# Patient Record
Sex: MALE | Race: WHITE | Employment: FULL TIME | URBAN - METROPOLITAN AREA
[De-identification: names, ages, dates, MRNs, and addresses within clinical notes are randomized per-mention and may not be internally consistent; named-entity substitution may affect disease eponyms.]

---

## 2019-12-17 ENCOUNTER — APPOINTMENT (OUTPATIENT)
Dept: GENERAL RADIOLOGY | Age: 69
End: 2019-12-17
Attending: EMERGENCY MEDICINE
Payer: MEDICARE

## 2019-12-17 ENCOUNTER — APPOINTMENT (OUTPATIENT)
Dept: CT IMAGING | Age: 69
End: 2019-12-17
Attending: EMERGENCY MEDICINE
Payer: MEDICARE

## 2019-12-17 ENCOUNTER — HOSPITAL ENCOUNTER (EMERGENCY)
Age: 69
Discharge: HOME | End: 2019-12-17
Attending: EMERGENCY MEDICINE
Payer: MEDICARE

## 2019-12-17 VITALS
TEMPERATURE: 97.7 F | SYSTOLIC BLOOD PRESSURE: 143 MMHG | BODY MASS INDEX: 26.6 KG/M2 | DIASTOLIC BLOOD PRESSURE: 88 MMHG | RESPIRATION RATE: 23 BRPM | HEART RATE: 60 BPM | OXYGEN SATURATION: 100 % | WEIGHT: 190 LBS | HEIGHT: 71 IN

## 2019-12-17 DIAGNOSIS — R77.8 ELEVATED TROPONIN: ICD-10-CM

## 2019-12-17 DIAGNOSIS — R01.1 MURMUR, HEART: ICD-10-CM

## 2019-12-17 DIAGNOSIS — R07.9 ACUTE CHEST PAIN: Primary | ICD-10-CM

## 2019-12-17 LAB
ALBUMIN SERPL-MCNC: 3.6 G/DL (ref 3.5–5)
ALBUMIN/GLOB SERPL: 1.2 {RATIO} (ref 1.1–2.2)
ALP SERPL-CCNC: 68 U/L (ref 45–117)
ALT SERPL-CCNC: 24 U/L (ref 12–78)
ANION GAP SERPL CALC-SCNC: 4 MMOL/L (ref 5–15)
AST SERPL-CCNC: 14 U/L (ref 15–37)
ATRIAL RATE: 61 BPM
ATRIAL RATE: 65 BPM
BASOPHILS # BLD: 0 K/UL (ref 0–0.1)
BASOPHILS NFR BLD: 1 % (ref 0–1)
BILIRUB SERPL-MCNC: 0.7 MG/DL (ref 0.2–1)
BUN SERPL-MCNC: 20 MG/DL (ref 6–20)
BUN/CREAT SERPL: 16 (ref 12–20)
CALCIUM SERPL-MCNC: 8.6 MG/DL (ref 8.5–10.1)
CALCULATED P AXIS, ECG09: 31 DEGREES
CALCULATED P AXIS, ECG09: 40 DEGREES
CALCULATED R AXIS, ECG10: 16 DEGREES
CALCULATED R AXIS, ECG10: 17 DEGREES
CALCULATED T AXIS, ECG11: 31 DEGREES
CALCULATED T AXIS, ECG11: 37 DEGREES
CHLORIDE SERPL-SCNC: 108 MMOL/L (ref 97–108)
CK SERPL-CCNC: 90 U/L (ref 39–308)
CO2 SERPL-SCNC: 28 MMOL/L (ref 21–32)
CREAT SERPL-MCNC: 1.22 MG/DL (ref 0.7–1.3)
DIAGNOSIS, 93000: NORMAL
DIAGNOSIS, 93000: NORMAL
DIFFERENTIAL METHOD BLD: ABNORMAL
EOSINOPHIL # BLD: 0.2 K/UL (ref 0–0.4)
EOSINOPHIL NFR BLD: 3 % (ref 0–7)
ERYTHROCYTE [DISTWIDTH] IN BLOOD BY AUTOMATED COUNT: 13.2 % (ref 11.5–14.5)
GLOBULIN SER CALC-MCNC: 3.1 G/DL (ref 2–4)
GLUCOSE SERPL-MCNC: 106 MG/DL (ref 65–100)
HCT VFR BLD AUTO: 41.7 % (ref 36.6–50.3)
HGB BLD-MCNC: 14 G/DL (ref 12.1–17)
IMM GRANULOCYTES # BLD AUTO: 0.1 K/UL (ref 0–0.04)
IMM GRANULOCYTES NFR BLD AUTO: 1 % (ref 0–0.5)
LYMPHOCYTES # BLD: 1.4 K/UL (ref 0.8–3.5)
LYMPHOCYTES NFR BLD: 26 % (ref 12–49)
MCH RBC QN AUTO: 31.6 PG (ref 26–34)
MCHC RBC AUTO-ENTMCNC: 33.6 G/DL (ref 30–36.5)
MCV RBC AUTO: 94.1 FL (ref 80–99)
MONOCYTES # BLD: 0.5 K/UL (ref 0–1)
MONOCYTES NFR BLD: 9 % (ref 5–13)
NEUTS SEG # BLD: 3.3 K/UL (ref 1.8–8)
NEUTS SEG NFR BLD: 61 % (ref 32–75)
NRBC # BLD: 0 K/UL (ref 0–0.01)
NRBC BLD-RTO: 0 PER 100 WBC
P-R INTERVAL, ECG05: 148 MS
P-R INTERVAL, ECG05: 150 MS
PLATELET # BLD AUTO: 149 K/UL (ref 150–400)
PMV BLD AUTO: 9.1 FL (ref 8.9–12.9)
POTASSIUM SERPL-SCNC: 4.2 MMOL/L (ref 3.5–5.1)
PROT SERPL-MCNC: 6.7 G/DL (ref 6.4–8.2)
Q-T INTERVAL, ECG07: 418 MS
Q-T INTERVAL, ECG07: 422 MS
QRS DURATION, ECG06: 102 MS
QRS DURATION, ECG06: 86 MS
QTC CALCULATION (BEZET), ECG08: 420 MS
QTC CALCULATION (BEZET), ECG08: 438 MS
RBC # BLD AUTO: 4.43 M/UL (ref 4.1–5.7)
SODIUM SERPL-SCNC: 140 MMOL/L (ref 136–145)
TROPONIN I SERPL-MCNC: 0.07 NG/ML
TROPONIN I SERPL-MCNC: 0.08 NG/ML
VENTRICULAR RATE, ECG03: 61 BPM
VENTRICULAR RATE, ECG03: 65 BPM
WBC # BLD AUTO: 5.4 K/UL (ref 4.1–11.1)

## 2019-12-17 PROCEDURE — 84484 ASSAY OF TROPONIN QUANT: CPT

## 2019-12-17 PROCEDURE — 99285 EMERGENCY DEPT VISIT HI MDM: CPT

## 2019-12-17 PROCEDURE — 85025 COMPLETE CBC W/AUTO DIFF WBC: CPT

## 2019-12-17 PROCEDURE — 82550 ASSAY OF CK (CPK): CPT

## 2019-12-17 PROCEDURE — 71046 X-RAY EXAM CHEST 2 VIEWS: CPT

## 2019-12-17 PROCEDURE — 36415 COLL VENOUS BLD VENIPUNCTURE: CPT

## 2019-12-17 PROCEDURE — 80053 COMPREHEN METABOLIC PANEL: CPT

## 2019-12-17 PROCEDURE — 70450 CT HEAD/BRAIN W/O DYE: CPT

## 2019-12-17 PROCEDURE — 93005 ELECTROCARDIOGRAM TRACING: CPT

## 2019-12-17 NOTE — ED PROVIDER NOTES
EMERGENCY DEPARTMENT HISTORY AND PHYSICAL EXAM      Date: 12/17/2019  Patient Name: Kenny Syed    History of Presenting Illness     Chief Complaint   Patient presents with    Chest Pain       History Provided By: Patient    HPI: Kenny Syed, 68 y.o. male presents to the ED with complaint of chest discomfort beginning Sunday while in [employer]. Patient reports he was sitting there and became dizzy. Jannette to stand and felt like he was listing to the right but this quickly resolved by the time he exited the [employer]. Since that time he is described left chest pain and \"my heart area\" and rates it a 6-7 out of 10 with episodes lasting 1 minute at a time. 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 He has 2 distant stress tests in the past and knows of a heart murmur but it is been sometime since he recently transition from [place] to the [facility]. He recently lost his longtime partner 2 months ago and says he is thoughtful that may be some of this pain he is having at night is because he is grieving. He does not have a history of an MI but does have hypertension no hyperlipidemia or diabetes history and a very distant history of tobacco use there is no known family history of coronary disease. There are no other complaints, changes, or physical findings at this time. PCP: Other, MD Tanner    No current facility-administered medications on file prior to encounter. Current Outpatient Medications on File Prior to Encounter   Medication Sig Dispense Refill    oxyCODONE-acetaminophen (PERCOCET) 5-325 mg per tablet Take 2 Tabs by mouth every four (4) hours as needed for Pain. 80 Tab 0    oxyCODONE IR (ROXICODONE) 5 mg immediate release tablet Take 1 Tab by mouth every six (6) hours as needed for Pain. 20 Tab 0    promethazine (PHENERGAN) 12.5 mg tablet Take 1 Tab by mouth every six (6) hours as needed for Nausea.  20 Tab 0    amLODIPine-benazepril (LOTREL) 5-20 mg per capsule Take 1 Cap by mouth daily. Past History     Past Medical History:  Past Medical History:   Diagnosis Date    Hypertension        Past Surgical History:  Past Surgical History:   Procedure Laterality Date    HX ORTHOPAEDIC      neck surgery       Family History:  History reviewed. No pertinent family history. Social History:  Social History     Tobacco Use    Smoking status: Never Smoker    Smokeless tobacco: Never Used   Substance Use Topics    Alcohol use: Yes    Drug use: No       Allergies:  No Known Allergies      Review of Systems   Review of Systems   Constitutional: Negative for activity change, appetite change, fever and unexpected weight change. HENT: Negative for congestion, sore throat and voice change. Eyes: Negative for visual disturbance. Respiratory: Negative for chest tightness and shortness of breath. Cardiovascular: Positive for chest pain. Negative for palpitations and leg swelling. No family history of PE or personal history of PE. Gastrointestinal: Negative for abdominal distention and abdominal pain. Genitourinary: Negative for difficulty urinating and hematuria. Neurological: Negative for dizziness, tremors, seizures, syncope, speech difficulty, weakness, light-headedness, numbness and headaches. Patient says he is no longer having any neurological symptoms like he was experiencing in [employer] on Sunday. He denies any difficulty with speech any weakness in his arms or legs or decreased sensation walking. Hematological: Negative for adenopathy. Does not bruise/bleed easily. All other systems reviewed and are negative. Physical Exam   Physical Exam   Vital signs and nursing notes reviewed    CONSTITUTIONAL: Alert, in mild distress; well-developed; well-nourished. HEAD:  Normocephalic, atraumatic  EYES: PERRL; EOM's intact. ENTM: Nose: no rhinorrhea;  Throat: no erythema or exudate, mucous membranes moist  Neck: Supple. trachea is midline. No JVD. Carotid bruits bilaterally. RESP: Chest clear, equal breath sounds. - W/R/R  CV: S1 and S2 WNL; 3 out of 6 systolic murmur best heard at the left sternal border. 2+ radial and DP pulses bilaterally. GI: non-distended, normal bowel sounds, abdomen soft and non-tender. No masses or organomegaly. No midline pulsatile mass on direct palpation. : No costo-vertebral angle tenderness. BACK:  Non-tender, normal appearance  UPPER EXT:  Normal inspection. no joint or soft tissue swelling  LOWER EXT: No edema, no calf tenderness. NEURO: Alert and oriented x3, 5/5 strength and light touch sensation intact in bilateral upper and lower extremities. No dysarthria, no neglect, horizontal gazes intact, good finger-to-nose, good heel-to-shin. SKIN: No rashes;  Warm and dry  PSYCH: Normal mood, normal affect    Diagnostic Study Results     Labs -     Recent Results (from the past 12 hour(s))   EKG, 12 LEAD, INITIAL    Collection Time: 12/17/19  9:20 AM   Result Value Ref Range    Ventricular Rate 65 BPM    Atrial Rate 65 BPM    P-R Interval 148 ms    QRS Duration 102 ms    Q-T Interval 422 ms    QTC Calculation (Bezet) 438 ms    Calculated P Axis 40 degrees    Calculated R Axis 17 degrees    Calculated T Axis 37 degrees    Diagnosis       Normal sinus rhythm  Possible Left atrial enlargement  Incomplete right bundle branch block    No previous ECGs available  Confirmed by Kimberly Tesfaye ([id number]) on 12/17/2019 10:25:24 AM     CBC WITH AUTOMATED DIFF    Collection Time: 12/17/19  9:35 AM   Result Value Ref Range    WBC 5.4 4.1 - 11.1 K/uL    RBC 4.43 4.10 - 5.70 M/uL    HGB 14.0 12.1 - 17.0 g/dL    HCT 41.7 36.6 - 50.3 %    MCV 94.1 80.0 - 99.0 FL    MCH 31.6 26.0 - 34.0 PG    MCHC 33.6 30.0 - 36.5 g/dL    RDW 13.2 11.5 - 14.5 %    PLATELET 608 (L) 727 - 400 K/uL    MPV 9.1 8.9 - 12.9 FL    NRBC 0.0 0  WBC    ABSOLUTE NRBC 0.00 0.00 - 0.01 K/uL    NEUTROPHILS 61 32 - 75 %    LYMPHOCYTES 26 12 - 49 %    MONOCYTES 9 5 - 13 %    EOSINOPHILS 3 0 - 7 %    BASOPHILS 1 0 - 1 %    IMMATURE GRANULOCYTES 1 (H) 0.0 - 0.5 %    ABS. NEUTROPHILS 3.3 1.8 - 8.0 K/UL    ABS. LYMPHOCYTES 1.4 0.8 - 3.5 K/UL    ABS. MONOCYTES 0.5 0.0 - 1.0 K/UL    ABS. EOSINOPHILS 0.2 0.0 - 0.4 K/UL    ABS. BASOPHILS 0.0 0.0 - 0.1 K/UL    ABS. IMM. GRANS. 0.1 (H) 0.00 - 0.04 K/UL    DF AUTOMATED     METABOLIC PANEL, COMPREHENSIVE    Collection Time: 12/17/19  9:35 AM   Result Value Ref Range    Sodium 140 136 - 145 mmol/L    Potassium 4.2 3.5 - 5.1 mmol/L    Chloride 108 97 - 108 mmol/L    CO2 28 21 - 32 mmol/L    Anion gap 4 (L) 5 - 15 mmol/L    Glucose 106 (H) 65 - 100 mg/dL    BUN 20 6 - 20 MG/DL    Creatinine 1.22 0.70 - 1.30 MG/DL    BUN/Creatinine ratio 16 12 - 20      GFR est AA >60 >60 ml/min/1.73m2    GFR est non-AA 59 (L) >60 ml/min/1.73m2    Calcium 8.6 8.5 - 10.1 MG/DL    Bilirubin, total 0.7 0.2 - 1.0 MG/DL    ALT (SGPT) 24 12 - 78 U/L    AST (SGOT) 14 (L) 15 - 37 U/L    Alk.  phosphatase 68 45 - 117 U/L    Protein, total 6.7 6.4 - 8.2 g/dL    Albumin 3.6 3.5 - 5.0 g/dL    Globulin 3.1 2.0 - 4.0 g/dL    A-G Ratio 1.2 1.1 - 2.2     CK W/ REFLX CKMB    Collection Time: 12/17/19  9:35 AM   Result Value Ref Range    CK 90 39 - 308 U/L   TROPONIN I    Collection Time: 12/17/19  9:35 AM   Result Value Ref Range    Troponin-I, Qt. 0.08 (H) <0.05 ng/mL   EKG, 12 LEAD, SUBSEQUENT    Collection Time: 12/17/19 12:34 PM   Result Value Ref Range    Ventricular Rate 61 BPM    Atrial Rate 61 BPM    P-R Interval 150 ms    QRS Duration 86 ms    Q-T Interval 418 ms    QTC Calculation (Bezet) 420 ms    Calculated P Axis 31 degrees    Calculated R Axis 16 degrees    Calculated T Axis 31 degrees    Diagnosis       Sinus rhythm with occasional premature ventricular complexes  When compared with ECG of 17-DEC-2019 09:20,  premature ventricular complexes are now present  Non-specific change in ST segment in Anterior leads  T wave inversion no longer evident in Anterior leads     TROPONIN I    Collection Time: 12/17/19 12:39 PM   Result Value Ref Range    Troponin-I, Qt. 0.07 (H) <0.05 ng/mL       Radiologic Studies -   CT HEAD WO CONT   Final Result   IMPRESSION:   1. No evidence of acute intracranial abnormality. 2. Moderate chronic microvascular ischemic disease. 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 XR CHEST PA LAT   Final Result   Impression: No acute process or change compared to the prior exam.           CT Results  (Last 48 hours)               12/17/19 1059  CT HEAD WO CONT Final result    Impression:  IMPRESSION:   1. No evidence of acute intracranial abnormality. 2. Moderate chronic microvascular ischemic disease. 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 Narrative:  EXAM:  CT HEAD WO CONT       INDICATION:   Chest pain and TIA symptoms. COMPARISON: CT head 12/21/2012. TECHNIQUE: Unenhanced CT of the head was performed using 5 mm images. Brain and   bone windows were generated. CT dose reduction was achieved through use of a   standardized protocol tailored for this examination and automatic exposure   control for dose modulation. FINDINGS:   The ventricles are normal in size and position. Scattered periventricular and   deep white matter hypodensities, confluent in regions, consistent with moderate   chronic microangiopathic ischemic changes. Chronic infarct in the left corona   radiata/basal ganglia. Basilar cisterns are patent. No midline shift. There is   no evidence of acute infarct, hemorrhage, or extraaxial fluid collection. The paranasal sinuses, mastoid air cells, and middle ears are clear. The orbital   contents are within normal limits with bilateral lens implants. There are no   significant osseous or extracranial soft tissue lesions.                CXR Results  (Last 48 hours)               12/17/19 0944  XR CHEST PA LAT Final result Impression:  Impression: No acute process or change compared to the prior exam.           Narrative:  Exam:  2 view chest       Indication: Chest pain for one week       Comparison to 12/21/2012. PA and lateral views demonstrate normal heart size. There is no acute process in   the lung fields. Degenerative changes are seen in the thoracic spine. Medical Decision Making   I am the first provider for this patient. I reviewed the vital signs, available nursing notes, past medical history, past surgical history, family history and social history. Vital Signs-Reviewed the patient's vital signs. Patient Vitals for the past 12 hrs:   Temp Pulse Resp BP SpO2   12/17/19 1300  60 23 143/88 100 %   12/17/19 1230  71 16 165/76 99 %   12/17/19 1200  61 18 152/84 99 %   12/17/19 1130  (!) 59 21 145/76 98 %   12/17/19 1112  62  151/80    12/17/19 1036  64 23 153/90 93 %   12/17/19 0925 97.7 °F (36.5 °C) 63 18 (!) 156/91 98 %       EKG interpretation: (Preliminary)  EKG performed at 9:20 AM, as interpreted by me shows normal sinus rhythm at axis, normal intervals, no visible acute ischemic changes. Subsequent EKG performed at 12:34 PM, as interpreted by me, patient was 0-10 chest pain shows a sinus rhythm at a rate of 61 normal PVC, no visible acute ischemic changes. Records Reviewed: Nursing Notes and Old Medical Records    Provider Notes (Medical Decision Making):   25-year-old male with acute chest pain and heart murmur with slightly elevated troponin without current chest discomfort. Discussed patient with Dr. Lauren Ordaz, who would like to see patient follow-up for his murmur and for further cardiac testing but reassured by second EKG and lower troponin. It is possible that patient potentially had a very short TIA but cannot confirm, old infarct is seen on CT but likely would be subacute if from Sunday at [employer].   Patient is asymptomatic on leaving, discussed follow-up and provided  Rafal's contact information for follow-up. ED Course:   Initial assessment performed. The patients presenting problems have been discussed, and they are in agreement with the care plan formulated and outlined with them. I have encouraged them to ask questions as they arise throughout their visit. Disposition:  Discharge    Discharge Note:  1:42 PM  The pt is ready for discharge. The pt's signs, symptoms, diagnosis, and discharge instructions have been discussed and pt has conveyed their understanding. The pt is to follow up as recommended or return to ER should their symptoms worsen. Plan has been discussed and pt is in agreement. PLAN:  1. Current Discharge Medication List        2. Follow-up Information     Follow up With Specialties Details Why [facility]  Schedule an appointment as soon as possible for a visit for stress cardiolite and an echocardiogram. [address]  [place] [address]    Glenn Berry MD Cardiology Schedule an appointment as soon as possible for a visit in 2 months  [address]  [id number]  [phone]      [facility] EMERGENCY DEPT Emergency Medicine  If symptoms worsen including new chest pain, new difficulty breathing, if you pass out or have sudden onset weakness in your face arms or legs. [address] [place] [address]  [phone]        Return to ED if worse     Diagnosis     Clinical Impression:   1. Acute chest pain    2. Murmur, heart    3. Elevated troponin        Attestations:    Joseph Akhtar MD    Please note that this dictation was completed with [employer], the computer voice recognition software. Quite often unanticipated grammatical, syntax, homophones, and other interpretive errors are inadvertently transcribed by the computer software. Please disregard these errors. Please excuse any errors that have escaped final proofreading.   Thank you.

## 2019-12-17 NOTE — CONSULTS
Cardiology Consult Note    CC: CP;    PCP: None yet. Reason for consult:  CP; indeterminate troponin  Requesting MD:  Dr. Walter Trejo Date: 12/17/2019   Today's Date: 12/17/2019      Cardiac Assessment/Plan:   CP: long h/o atypical CP; No ischemia on ecg; indeterminate trop x 1. Agree if second set of cardiac enzymes are unchanged, ECG remains stable, and no new symptoms occur, I recommend discharge for outpatient stress cardiolite; please have the patient call [phone] to schedule. Murmur c/w MR: needs outpt echo. HTN: cont home Rx. Rhythm: sinus    Volume status:euvolemic  Renal function: stable    Dispo: per Dr Bladimir Waggoner. The patient reports VERY long h/o atypical CP (> 10years; 1-3 x/month; left/central heaviness; 2-3 min duration; Random, not exertional). 2 neg stress test (last approx 2009 in [place]). No exertional CP nor BUTT with daily activities (occ BUTT with heavy work, eg: cutting/hauling wood). Worse Sx at night lately; LOTs of stress with wife's recent death (2 months ago from cancer; Dr Francia Slater). No PND, orthopnea, palpitations, pre-syncope, syncope, peripheral edema, or decrease in exercise tolerance. No current complaints. ECG: sinus; iRBBB; no STT changes. Tele: sinus  CXR: \"No acute process or change compared to the prior exam.\"  Notable labs: Nl CMP, CBC, CK; trop 0.08    Notable prior cardiac history:  No known CAD; H/O \"murmur:\" no echo in many yrs. 0-6 Etoh/Week; no nicotine; occ salt (\"watermelon etc'); 1 caff federica/day. 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 From [place] then [place]; moved to [place] 1 yr ago. Past Medical History:   Diagnosis Date    Hypertension       Past Surgical History:   Procedure Laterality Date    HX ORTHOPAEDIC      neck surgery     No Known Allergies   History reviewed. No pertinent family history.    Social History     Socioeconomic History    Marital status:      Spouse name: Not on file    Number of children: Not on file    Years of education: Not on file    Highest education level: Not on file   Occupational History    Not on file   Social Needs    Financial resource strain: Not on file    Food insecurity:     Worry: Not on file     Inability: Not on file    Transportation needs:     Medical: Not on file     Non-medical: Not on file   Tobacco Use    Smoking status: Never Smoker    Smokeless tobacco: Never Used   Substance and Sexual Activity    Alcohol use: Yes    Drug use: No    Sexual activity: Not on file   Lifestyle    Physical activity:     Days per week: Not on file     Minutes per session: Not on file    Stress: Not on file   Relationships    Social connections:     Talks on phone: Not on file     Gets together: Not on file     Attends [employer] service: Not on file     Active member of club or organization: Not on file     Attends meetings of clubs or organizations: Not on file     Relationship status: Not on file    Intimate partner violence:     Fear of current or ex partner: Not on file     Emotionally abused: Not on file     Physically abused: Not on file     Forced sexual activity: Not on file   Other Topics Concern    Not on file   Social History Narrative    Not on file     No current facility-administered medications for this encounter. Current Outpatient Medications   Medication Sig    oxyCODONE-acetaminophen (PERCOCET) 5-325 mg per tablet Take 2 Tabs by mouth every four (4) hours as needed for Pain.  oxyCODONE IR (ROXICODONE) 5 mg immediate release tablet Take 1 Tab by mouth every six (6) hours as needed for Pain.  promethazine (PHENERGAN) 12.5 mg tablet Take 1 Tab by mouth every six (6) hours as needed for Nausea.  amLODIPine-benazepril (LOTREL) 5-20 mg per capsule Take 1 Cap by mouth daily. Prior to Admission Medications:  Prior to Admission medications    Medication Sig Start Date End Date Taking?  Authorizing Provider oxyCODONE-acetaminophen (PERCOCET) 5-325 mg per tablet Take 2 Tabs by mouth every four (4) hours as needed for Pain. 12   Ofilia Goldberg, MD   oxyCODONE IR (ROXICODONE) 5 mg immediate release tablet Take 1 Tab by mouth every six (6) hours as needed for Pain. 12   Ofilia Goldberg, MD   promethazine (PHENERGAN) 12.5 mg tablet Take 1 Tab by mouth every six (6) hours as needed for Nausea. 12   Ofilia Goldberg, MD   amLODIPine-benazepril (LOTREL) 5-20 mg per capsule Take 1 Cap by mouth daily. Provider, Historical        Review of Symptoms: Except as noted in HPI, patient denies recent fever or chills, nausea, vomiting, diarrhea, hemoptysis, hematemesis, dysuria, myalgias, focal neurologic symptoms, ecchymosis, angioedema, odynophagia, dysphagia, sore throat, earache,rash, melena, hematochezia, depression, GERD, cold intolerance, petechia, bleeding gums, or significant weight loss. 24 hr VS reviewed, overall VSSAF  Temp (24hrs), Av.7 °F (36.5 °C), Min:97.7 °F (36.5 °C), Max:97.7 °F (36.5 °C)    Patient Vitals for the past 8 hrs:   Pulse   19 1200 61   19 1130 (!) 59   19 1112 62   19 1036 64   19 0925 63     Patient Vitals for the past 8 hrs:   Resp   19 1200 18   19 1130 21   19 1036 23   19 0925 18     Patient Vitals for the past 8 hrs:   BP   19 1200 152/84   19 1130 145/76   19 1112 151/80   19 1036 153/90   19 0925 (!) 156/91     No intake or output data in the 24 hours ending 19 1227      Physical Exam (complete single organ system exam)    Cons: The patient is no distress. Appears stated age. HEENT: Normal conjunctivae and palate. No xanthelasma. Neck: Flat JVP without appreciable HJR. Resp: Normal respiratory effort with clear lungs bilaterally. CV: Regular rate and rhythm. PMI not palpated. Normal S1,S2  No gallop or rubs appreciated. No murmur appreciated.   Intact carotid upstroke bilaterally without appreciated bruits. Abdominal aorta not palpated; no abdominal bruit noted. Normal femoral pulses without bruits. Intact pedal pulses. No peripheral edema. GI: No abd mass noted, soft; no organomegaly noted. Bowel sounds present. Muscular:  No significant kyphosis. Strength WNL for age. Ext: No cyanosis, clubbing, or stigmata of peripheral embolization. Derm: No ulcers or stasis dermatitis of lower extremities. Neuro: Alert and oriented x 3;  Grossly non-focal. Normal mood and affect. Labs:   Recent Results (from the past 24 hour(s))   EKG, 12 LEAD, INITIAL    Collection Time: 12/17/19  9:20 AM   Result Value Ref Range    Ventricular Rate 65 BPM    Atrial Rate 65 BPM    P-R Interval 148 ms    QRS Duration 102 ms    Q-T Interval 422 ms    QTC Calculation (Bezet) 438 ms    Calculated P Axis 40 degrees    Calculated R Axis 17 degrees    Calculated T Axis 37 degrees    Diagnosis       Normal sinus rhythm  Possible Left atrial enlargement  Incomplete right bundle branch block    No previous ECGs available  Confirmed by Eileen Caceres ([id number]) on 12/17/2019 10:25:24 AM     CBC WITH AUTOMATED DIFF    Collection Time: 12/17/19  9:35 AM   Result Value Ref Range    WBC 5.4 4.1 - 11.1 K/uL    RBC 4.43 4.10 - 5.70 M/uL    HGB 14.0 12.1 - 17.0 g/dL    HCT 41.7 36.6 - 50.3 %    MCV 94.1 80.0 - 99.0 FL    MCH 31.6 26.0 - 34.0 PG    MCHC 33.6 30.0 - 36.5 g/dL    RDW 13.2 11.5 - 14.5 %    PLATELET 150 (L) 588 - 400 K/uL    MPV 9.1 8.9 - 12.9 FL    NRBC 0.0 0  WBC    ABSOLUTE NRBC 0.00 0.00 - 0.01 K/uL    NEUTROPHILS 61 32 - 75 %    LYMPHOCYTES 26 12 - 49 %    MONOCYTES 9 5 - 13 %    EOSINOPHILS 3 0 - 7 %    BASOPHILS 1 0 - 1 %    IMMATURE GRANULOCYTES 1 (H) 0.0 - 0.5 %    ABS. NEUTROPHILS 3.3 1.8 - 8.0 K/UL    ABS. LYMPHOCYTES 1.4 0.8 - 3.5 K/UL    ABS. MONOCYTES 0.5 0.0 - 1.0 K/UL    ABS. EOSINOPHILS 0.2 0.0 - 0.4 K/UL    ABS. BASOPHILS 0.0 0.0 - 0.1 K/UL    ABS. IMM.  GRANS. 0.1 (H) 0.00 - 0.04 K/UL    DF AUTOMATED     METABOLIC PANEL, COMPREHENSIVE    Collection Time: 12/17/19  9:35 AM   Result Value Ref Range    Sodium 140 136 - 145 mmol/L    Potassium 4.2 3.5 - 5.1 mmol/L    Chloride 108 97 - 108 mmol/L    CO2 28 21 - 32 mmol/L    Anion gap 4 (L) 5 - 15 mmol/L    Glucose 106 (H) 65 - 100 mg/dL    BUN 20 6 - 20 MG/DL    Creatinine 1.22 0.70 - 1.30 MG/DL    BUN/Creatinine ratio 16 12 - 20      GFR est AA >60 >60 ml/min/1.73m2    GFR est non-AA 59 (L) >60 ml/min/1.73m2    Calcium 8.6 8.5 - 10.1 MG/DL    Bilirubin, total 0.7 0.2 - 1.0 MG/DL    ALT (SGPT) 24 12 - 78 U/L    AST (SGOT) 14 (L) 15 - 37 U/L    Alk.  phosphatase 68 45 - 117 U/L    Protein, total 6.7 6.4 - 8.2 g/dL    Albumin 3.6 3.5 - 5.0 g/dL    Globulin 3.1 2.0 - 4.0 g/dL    A-G Ratio 1.2 1.1 - 2.2     CK W/ REFLX CKMB    Collection Time: 12/17/19  9:35 AM   Result Value Ref Range    CK 90 39 - 308 U/L   TROPONIN I    Collection Time: 12/17/19  9:35 AM   Result Value Ref Range    Troponin-I, Qt. 0.08 (H) <0.05 ng/mL     Recent Labs     12/17/19  0935   TROIQ 0.08*       Recent Labs     12/17/19  0935      K 4.2      CO2 28   BUN 20   CREA 1.22   GFRAA >60   *   CA 8.6   ALB 3.6   WBC 5.4   HGB 14.0   HCT 41.7   *       Vik Zendejas MD

## 2019-12-17 NOTE — ED TRIAGE NOTES
Pt arrives to ED with c/o chest pain for a week. Pt denies any SOB. Pt placed on continuous cardiac monitor and pulse ox. Side rails up for safety, call light within reach. Denies any needs at this time.

## 2023-07-28 ENCOUNTER — APPOINTMENT (OUTPATIENT)
Facility: HOSPITAL | Age: 73
End: 2023-07-28
Payer: MEDICARE

## 2023-07-28 ENCOUNTER — HOSPITAL ENCOUNTER (EMERGENCY)
Facility: HOSPITAL | Age: 73
Discharge: HOME | End: 2023-07-28
Attending: EMERGENCY MEDICINE
Payer: MEDICARE

## 2023-07-28 VITALS
RESPIRATION RATE: 14 BRPM | DIASTOLIC BLOOD PRESSURE: 94 MMHG | OXYGEN SATURATION: 97 % | WEIGHT: 182.76 LBS | TEMPERATURE: 98.4 F | HEART RATE: 65 BPM | SYSTOLIC BLOOD PRESSURE: 171 MMHG

## 2023-07-28 DIAGNOSIS — M79.89 SWELLING OF RIGHT HAND: ICD-10-CM

## 2023-07-28 DIAGNOSIS — R60.0 LEG EDEMA: Primary | ICD-10-CM

## 2023-07-28 PROCEDURE — 73630 X-RAY EXAM OF FOOT: CPT

## 2023-07-28 PROCEDURE — 99284 EMERGENCY DEPT VISIT MOD MDM: CPT

## 2023-07-28 PROCEDURE — 73130 X-RAY EXAM OF HAND: CPT

## 2023-07-28 PROCEDURE — 93971 EXTREMITY STUDY: CPT

## 2023-07-28 ASSESSMENT — PAIN SCALES - GENERAL: PAINLEVEL_OUTOF10: 0

## 2023-07-28 NOTE — ED NOTES
This rn at bedside went over all papers with pt at time of dc. Pt verbalized understanding at time of dc.  Pt was ambulatory at time of dc without assistance        Jaya Tariq RN  07/28/23 [id number]

## 2024-11-06 ENCOUNTER — APPOINTMENT (OUTPATIENT)
Facility: HOSPITAL | Age: 74
End: 2024-11-06
Payer: MEDICARE

## 2024-11-06 ENCOUNTER — HOSPITAL ENCOUNTER (EMERGENCY)
Facility: HOSPITAL | Age: 74
Discharge: HOME | End: 2024-11-06
Attending: STUDENT IN AN ORGANIZED HEALTH CARE EDUCATION/TRAINING PROGRAM
Payer: MEDICARE

## 2024-11-06 VITALS
HEART RATE: 63 BPM | BODY MASS INDEX: 26.64 KG/M2 | RESPIRATION RATE: 19 BRPM | SYSTOLIC BLOOD PRESSURE: 152 MMHG | WEIGHT: 190.26 LBS | TEMPERATURE: 98 F | HEIGHT: 71 IN | DIASTOLIC BLOOD PRESSURE: 60 MMHG | OXYGEN SATURATION: 90 %

## 2024-11-06 DIAGNOSIS — R07.9 CHEST PAIN, UNSPECIFIED TYPE: Primary | ICD-10-CM

## 2024-11-06 LAB
ALBUMIN SERPL-MCNC: 3.7 G/DL (ref 3.5–5)
ALBUMIN/GLOB SERPL: 1.1 (ref 1.1–2.2)
ALP SERPL-CCNC: 95 U/L (ref 45–117)
ALT SERPL-CCNC: 32 U/L (ref 12–78)
ANION GAP SERPL CALC-SCNC: 3 MMOL/L (ref 2–12)
AST SERPL-CCNC: 22 U/L (ref 15–37)
BASOPHILS # BLD: 0 K/UL (ref 0–0.1)
BASOPHILS NFR BLD: 1 % (ref 0–1)
BILIRUB SERPL-MCNC: 0.6 MG/DL (ref 0.2–1)
BUN SERPL-MCNC: 29 MG/DL (ref 6–20)
BUN/CREAT SERPL: 20 (ref 12–20)
CALCIUM SERPL-MCNC: 9.5 MG/DL (ref 8.5–10.1)
CHLORIDE SERPL-SCNC: 107 MMOL/L (ref 97–108)
CO2 SERPL-SCNC: 28 MMOL/L (ref 21–32)
COMMENT:: NORMAL
CREAT SERPL-MCNC: 1.47 MG/DL (ref 0.7–1.3)
DIFFERENTIAL METHOD BLD: ABNORMAL
EKG ATRIAL RATE: 107 BPM
EKG DIAGNOSIS: NORMAL
EKG P AXIS: 41 DEGREES
EKG P-R INTERVAL: 142 MS
EKG Q-T INTERVAL: 388 MS
EKG QRS DURATION: 100 MS
EKG QTC CALCULATION (BAZETT): 447 MS
EKG R AXIS: 47 DEGREES
EKG T AXIS: 17 DEGREES
EKG VENTRICULAR RATE: 80 BPM
EOSINOPHIL # BLD: 0.2 K/UL (ref 0–0.4)
EOSINOPHIL NFR BLD: 3 % (ref 0–7)
ERYTHROCYTE [DISTWIDTH] IN BLOOD BY AUTOMATED COUNT: 13.3 % (ref 11.5–14.5)
GLOBULIN SER CALC-MCNC: 3.3 G/DL (ref 2–4)
GLUCOSE SERPL-MCNC: 108 MG/DL (ref 65–100)
HCT VFR BLD AUTO: 41.3 % (ref 36.6–50.3)
HGB BLD-MCNC: 14 G/DL (ref 12.1–17)
IMM GRANULOCYTES # BLD AUTO: 0 K/UL (ref 0–0.04)
IMM GRANULOCYTES NFR BLD AUTO: 0 % (ref 0–0.5)
LYMPHOCYTES # BLD: 1.3 K/UL (ref 0.8–3.5)
LYMPHOCYTES NFR BLD: 20 % (ref 12–49)
MCH RBC QN AUTO: 31.5 PG (ref 26–34)
MCHC RBC AUTO-ENTMCNC: 33.9 G/DL (ref 30–36.5)
MCV RBC AUTO: 92.8 FL (ref 80–99)
MONOCYTES # BLD: 0.6 K/UL (ref 0–1)
MONOCYTES NFR BLD: 9 % (ref 5–13)
NEUTS SEG # BLD: 4.5 K/UL (ref 1.8–8)
NEUTS SEG NFR BLD: 68 % (ref 32–75)
NRBC # BLD: 0 K/UL (ref 0–0.01)
NRBC BLD-RTO: 0 PER 100 WBC
NT PRO BNP: 249 PG/ML
PLATELET # BLD AUTO: 144 K/UL (ref 150–400)
PMV BLD AUTO: 10.1 FL (ref 8.9–12.9)
POTASSIUM SERPL-SCNC: 3.8 MMOL/L (ref 3.5–5.1)
PROT SERPL-MCNC: 7 G/DL (ref 6.4–8.2)
RBC # BLD AUTO: 4.45 M/UL (ref 4.1–5.7)
SODIUM SERPL-SCNC: 138 MMOL/L (ref 136–145)
SPECIMEN HOLD: NORMAL
TROPONIN I SERPL HS-MCNC: 24 NG/L (ref 0–76)
TROPONIN I SERPL HS-MCNC: 24 NG/L (ref 0–76)
WBC # BLD AUTO: 6.7 K/UL (ref 4.1–11.1)

## 2024-11-06 PROCEDURE — 99285 EMERGENCY DEPT VISIT HI MDM: CPT

## 2024-11-06 PROCEDURE — 85025 COMPLETE CBC W/AUTO DIFF WBC: CPT

## 2024-11-06 PROCEDURE — 71046 X-RAY EXAM CHEST 2 VIEWS: CPT

## 2024-11-06 PROCEDURE — 83880 ASSAY OF NATRIURETIC PEPTIDE: CPT

## 2024-11-06 PROCEDURE — 80053 COMPREHEN METABOLIC PANEL: CPT

## 2024-11-06 PROCEDURE — 93005 ELECTROCARDIOGRAM TRACING: CPT

## 2024-11-06 PROCEDURE — 36415 COLL VENOUS BLD VENIPUNCTURE: CPT

## 2024-11-06 PROCEDURE — 84484 ASSAY OF TROPONIN QUANT: CPT

## 2024-11-06 ASSESSMENT — ENCOUNTER SYMPTOMS
RHINORRHEA: 0
VOMITING: 0
ABDOMINAL PAIN: 0
TROUBLE SWALLOWING: 0
NAUSEA: 0
SHORTNESS OF BREATH: 0
BACK PAIN: 0

## 2024-11-06 ASSESSMENT — HEART SCORE: ECG: NORMAL

## 2024-11-06 ASSESSMENT — PAIN - FUNCTIONAL ASSESSMENT: PAIN_FUNCTIONAL_ASSESSMENT: NONE - DENIES PAIN

## 2024-11-06 NOTE — ED TRIAGE NOTES
Patient is coming in with chest pain intermittent for the past 2 days. Radiates to the left arm and the back. Patient denies chest pain at this time. Patient is diaphoretic in triage. Patient has unsteady gait for the past 2 years.

## 2024-11-06 NOTE — CONSULTS
HCT 41.3 11/06/2024 11:09 AM     11/06/2024 11:09 AM    MCV 92.8 11/06/2024 11:09 AM     Troponin: 24 x 2 samples (normal).    Data review:  EKG tracing personally reviewed:   NSR with frequent PVCs, no ST segment changes.     Echocardiogram:  9/10/22  CONCLUSIONS     1. Left ventricle: LV cavity size is normal. Wall thickness is      normal. Systolic function is normal by biplane method of disks;      EF 60% (+/-5%). No segmental wall motion abnormalities.      Inconclusive diastolic evaluation due to conflicting data.   2. Left atrium: LA severely dilated as determined by left atrial      volume index.   3. Mitral valve: Severe prolapse, P2 scallop posterior leaflet.      Moderate mitral regurgitation directed eccentrically and      anteriorly; eccentric nature may underestimate the      regurgitation. Effective regurgitant orifice (PISA) 0.29cm^2.      Regurgitant volume (PISA) 46ml.   4. Tricuspid valve: There is mild tricuspid regurgitation.       Assessment & Recommendations / Plan:    Chest pain  Unclear etiology  Could be due to the PVCs  Currently chest pain free and serial troponin normal  Suggest outpatient PETCT stress and FUV with Dr Villa (I will message our office staff to arrange).   PVCs  Frequent PVCs, at times in pattern of bigeminy  Add metoprolol 25mg daily (will send prescription to his pharmacy)  HTN  Continue home meds      Signed:  Prakash Gonzalez  Interventional Cardiology  11/06/24

## 2024-11-06 NOTE — ED PROVIDER NOTES
TO CARDIOLOGY    PROCEDURES:  Unless otherwise noted below, none     Procedures      FINAL IMPRESSION      1. Chest pain, unspecified type          DISPOSITION/PLAN   DISPOSITION Decision To Discharge 11/06/2024 02:14:38 PM      PATIENT REFERRED TO:  Tarah Villa MD  [address] [facility]    [address]  [facility] [address] [phone]    Schedule an appointment as soon as possible for a visit       [facility] EMERGENCY DEP  [address] [place] [id number] [phone]    If symptoms worsen      DISCHARGE MEDICATIONS:  New Prescriptions    No medications on file         (Please note that portions of this note were completed with a voice recognition program.  Efforts were made to edit the dictations but occasionally words are mis-transcribed.)    German Guerrier MD (electronically signed)  Emergency Attending Physician / Physician Assistant / Nurse Practitioner             German Guerrier MD  11/06/24 [id number]

## 2024-11-06 NOTE — ED NOTES
Dr. Guerrier reviewed discharge instructions with the patient.  The patient verbalized understanding.  All questions and concerns were addressed.  The patient declined a wheelchair and is discharged ambulatory in the care of family members with instructions and prescriptions in hand.  Pt is alert and oriented x 4.  Respirations are clear and unlabored.

## 2024-11-06 NOTE — ED NOTES
10:59 AM  I have evaluated the patient as the Provider in Rapid Medical Evaluation (RME). I have reviewed his vital signs and the triage nurse assessment. I have talked with the patient and any available family and advised that I am the provider in triage and have ordered the appropriate study to initiate their work up based on the clinical presentation during my assessment. I have advised that the patient will be accommodated in the Main ED as soon as possible. I have also requested to contact the triage nurse or myself immediately if the patient experiences any changes in their condition during this brief waiting period.    74-year-old male with past medical history of hypertension presents with complaint of intermittent chest pain for the past 2 days.  This morning it got worse.  Radiates to left arm and back.  Denies history of heart failure.  Known murmur.  Patient sweaty in triage.  Heart rate fluctuating down into the high 30s.  Denies shortness of breath.    AILYN Vaughn Andrea K, PA-C  11/06/24 1100